# Patient Record
Sex: FEMALE | Race: WHITE | Employment: FULL TIME | URBAN - METROPOLITAN AREA
[De-identification: names, ages, dates, MRNs, and addresses within clinical notes are randomized per-mention and may not be internally consistent; named-entity substitution may affect disease eponyms.]

---

## 2017-02-18 ENCOUNTER — HOSPITAL ENCOUNTER (OUTPATIENT)
Dept: NON INVASIVE DIAGNOSTICS | Age: 52
Discharge: HOME | End: 2017-02-18
Attending: FAMILY MEDICINE | Admitting: FAMILY MEDICINE

## 2017-02-18 LAB
ANION GAP SERPL CALCULATED.3IONS-SCNC: 14 MMOL/L (ref 3–16)
BASOPHILS ABSOLUTE: 0 K/UL (ref 0–0.2)
BASOPHILS RELATIVE PERCENT: 0.6 %
BUN BLDV-MCNC: 12 MG/DL (ref 7–20)
CALCIUM SERPL-MCNC: 9.4 MG/DL (ref 8.3–10.6)
CHLORIDE BLD-SCNC: 101 MMOL/L (ref 99–110)
CHOLESTEROL, TOTAL: 227 MG/DL (ref 0–199)
CO2: 28 MMOL/L (ref 21–32)
CREAT SERPL-MCNC: 0.6 MG/DL (ref 0.6–1.1)
EOSINOPHILS ABSOLUTE: 0.1 K/UL (ref 0–0.6)
EOSINOPHILS RELATIVE PERCENT: 2.4 %
GFR AFRICAN AMERICAN: >60
GFR NON-AFRICAN AMERICAN: >60
GLUCOSE BLD-MCNC: 84 MG/DL (ref 70–99)
HCT VFR BLD CALC: 45.3 % (ref 36–48)
HDLC SERPL-MCNC: 68 MG/DL (ref 40–60)
HEMOGLOBIN: 14.8 G/DL (ref 12–16)
LDL CHOLESTEROL CALCULATED: 138 MG/DL
LYMPHOCYTES ABSOLUTE: 1.9 K/UL (ref 1–5.1)
LYMPHOCYTES RELATIVE PERCENT: 35.1 %
MCH RBC QN AUTO: 30.2 PG (ref 26–34)
MCHC RBC AUTO-ENTMCNC: 32.6 G/DL (ref 31–36)
MCV RBC AUTO: 92.7 FL (ref 80–100)
MONOCYTES ABSOLUTE: 0.4 K/UL (ref 0–1.3)
MONOCYTES RELATIVE PERCENT: 7.7 %
NEUTROPHILS ABSOLUTE: 2.9 K/UL (ref 1.7–7.7)
NEUTROPHILS RELATIVE PERCENT: 54.2 %
PDW BLD-RTO: 12.3 % (ref 12.4–15.4)
PLATELET # BLD: 188 K/UL (ref 135–450)
PMV BLD AUTO: 9.7 FL (ref 5–10.5)
POTASSIUM SERPL-SCNC: 4.7 MMOL/L (ref 3.5–5.1)
RBC # BLD: 4.89 M/UL (ref 4–5.2)
SODIUM BLD-SCNC: 143 MMOL/L (ref 136–145)
TRIGL SERPL-MCNC: 103 MG/DL (ref 0–150)
VITAMIN D 25-HYDROXY: 26.1 NG/ML
VLDLC SERPL CALC-MCNC: 21 MG/DL
WBC # BLD: 5.4 K/UL (ref 4–11)

## 2019-09-11 ENCOUNTER — HOSPITAL ENCOUNTER (INPATIENT)
Age: 54
LOS: 1 days | Discharge: HOME | DRG: 390 | End: 2019-09-12
Attending: EMERGENCY MEDICINE | Admitting: INTERNAL MEDICINE
Payer: COMMERCIAL

## 2019-09-11 ENCOUNTER — APPOINTMENT (OUTPATIENT)
Dept: GENERAL RADIOLOGY | Age: 54
DRG: 390 | End: 2019-09-11
Payer: COMMERCIAL

## 2019-09-11 ENCOUNTER — APPOINTMENT (OUTPATIENT)
Dept: CT IMAGING | Age: 54
DRG: 390 | End: 2019-09-11
Payer: COMMERCIAL

## 2019-09-11 DIAGNOSIS — K56.600 PARTIAL INTESTINAL OBSTRUCTION, UNSPECIFIED CAUSE (HCC): Primary | ICD-10-CM

## 2019-09-11 LAB
ALBUMIN SERPL-MCNC: 4.6 G/DL (ref 3.4–5)
ALP BLD-CCNC: 99 U/L (ref 40–129)
ALT SERPL-CCNC: 22 U/L (ref 10–40)
ANION GAP SERPL CALCULATED.3IONS-SCNC: 13 MMOL/L (ref 3–16)
AST SERPL-CCNC: 22 U/L (ref 15–37)
BACTERIA: ABNORMAL /HPF
BASOPHILS ABSOLUTE: 0 K/UL (ref 0–0.2)
BASOPHILS RELATIVE PERCENT: 0.3 %
BILIRUB SERPL-MCNC: 0.6 MG/DL (ref 0–1)
BILIRUBIN DIRECT: <0.2 MG/DL (ref 0–0.3)
BILIRUBIN URINE: NEGATIVE
BILIRUBIN, INDIRECT: NORMAL MG/DL (ref 0–1)
BLOOD, URINE: NEGATIVE
BUN BLDV-MCNC: 12 MG/DL (ref 7–20)
C-REACTIVE PROTEIN: 4.6 MG/L (ref 0–5.1)
C4 COMPLEMENT: 18.1 MG/DL (ref 10–40)
CALCIUM SERPL-MCNC: 9.9 MG/DL (ref 8.3–10.6)
CHLORIDE BLD-SCNC: 103 MMOL/L (ref 99–110)
CLARITY: CLEAR
CO2: 27 MMOL/L (ref 21–32)
COLOR: YELLOW
CREAT SERPL-MCNC: 0.5 MG/DL (ref 0.6–1.1)
EOSINOPHILS ABSOLUTE: 0.1 K/UL (ref 0–0.6)
EOSINOPHILS RELATIVE PERCENT: 1.7 %
EPITHELIAL CELLS, UA: ABNORMAL /HPF
GFR AFRICAN AMERICAN: >60
GFR NON-AFRICAN AMERICAN: >60
GLUCOSE BLD-MCNC: 114 MG/DL (ref 70–99)
GLUCOSE URINE: NEGATIVE MG/DL
HCT VFR BLD CALC: 45.6 % (ref 36–48)
HEMOGLOBIN: 14.8 G/DL (ref 12–16)
KETONES, URINE: NEGATIVE MG/DL
LEUKOCYTE ESTERASE, URINE: NEGATIVE
LIPASE: 20 U/L (ref 13–60)
LYMPHOCYTES ABSOLUTE: 1.3 K/UL (ref 1–5.1)
LYMPHOCYTES RELATIVE PERCENT: 15.7 %
MCH RBC QN AUTO: 29.5 PG (ref 26–34)
MCHC RBC AUTO-ENTMCNC: 32.5 G/DL (ref 31–36)
MCV RBC AUTO: 90.8 FL (ref 80–100)
MICROSCOPIC EXAMINATION: YES
MONOCYTES ABSOLUTE: 0.5 K/UL (ref 0–1.3)
MONOCYTES RELATIVE PERCENT: 6 %
MUCUS: ABNORMAL /LPF
NEUTROPHILS ABSOLUTE: 6.6 K/UL (ref 1.7–7.7)
NEUTROPHILS RELATIVE PERCENT: 76.3 %
NITRITE, URINE: NEGATIVE
PDW BLD-RTO: 12.1 % (ref 12.4–15.4)
PH UA: >=9 (ref 5–8)
PLATELET # BLD: 235 K/UL (ref 135–450)
PMV BLD AUTO: 9.1 FL (ref 5–10.5)
POTASSIUM REFLEX MAGNESIUM: 3.9 MMOL/L (ref 3.5–5.1)
PROTEIN UA: 30 MG/DL
RBC # BLD: 5.03 M/UL (ref 4–5.2)
RBC UA: ABNORMAL /HPF (ref 0–2)
SODIUM BLD-SCNC: 143 MMOL/L (ref 136–145)
SPECIFIC GRAVITY UA: 1.01 (ref 1–1.03)
TOTAL PROTEIN: 7.3 G/DL (ref 6.4–8.2)
URINE REFLEX TO CULTURE: ABNORMAL
URINE TYPE: ABNORMAL
UROBILINOGEN, URINE: 0.2 E.U./DL
WBC # BLD: 8.5 K/UL (ref 4–11)
WBC UA: ABNORMAL /HPF (ref 0–5)

## 2019-09-11 PROCEDURE — 74019 RADEX ABDOMEN 2 VIEWS: CPT

## 2019-09-11 PROCEDURE — 86140 C-REACTIVE PROTEIN: CPT

## 2019-09-11 PROCEDURE — 80048 BASIC METABOLIC PNL TOTAL CA: CPT

## 2019-09-11 PROCEDURE — C9113 INJ PANTOPRAZOLE SODIUM, VIA: HCPCS | Performed by: EMERGENCY MEDICINE

## 2019-09-11 PROCEDURE — 6360000004 HC RX CONTRAST MEDICATION: Performed by: EMERGENCY MEDICINE

## 2019-09-11 PROCEDURE — 1200000000 HC SEMI PRIVATE

## 2019-09-11 PROCEDURE — 83690 ASSAY OF LIPASE: CPT

## 2019-09-11 PROCEDURE — 36415 COLL VENOUS BLD VENIPUNCTURE: CPT

## 2019-09-11 PROCEDURE — 74177 CT ABD & PELVIS W/CONTRAST: CPT

## 2019-09-11 PROCEDURE — 96376 TX/PRO/DX INJ SAME DRUG ADON: CPT

## 2019-09-11 PROCEDURE — 2580000003 HC RX 258: Performed by: EMERGENCY MEDICINE

## 2019-09-11 PROCEDURE — 96361 HYDRATE IV INFUSION ADD-ON: CPT

## 2019-09-11 PROCEDURE — 6360000002 HC RX W HCPCS: Performed by: EMERGENCY MEDICINE

## 2019-09-11 PROCEDURE — 99285 EMERGENCY DEPT VISIT HI MDM: CPT

## 2019-09-11 PROCEDURE — 80076 HEPATIC FUNCTION PANEL: CPT

## 2019-09-11 PROCEDURE — 86160 COMPLEMENT ANTIGEN: CPT

## 2019-09-11 PROCEDURE — 81001 URINALYSIS AUTO W/SCOPE: CPT

## 2019-09-11 PROCEDURE — 96365 THER/PROPH/DIAG IV INF INIT: CPT

## 2019-09-11 PROCEDURE — 96375 TX/PRO/DX INJ NEW DRUG ADDON: CPT

## 2019-09-11 PROCEDURE — 6370000000 HC RX 637 (ALT 250 FOR IP): Performed by: INTERNAL MEDICINE

## 2019-09-11 PROCEDURE — 85025 COMPLETE CBC W/AUTO DIFF WBC: CPT

## 2019-09-11 PROCEDURE — 2580000003 HC RX 258: Performed by: INTERNAL MEDICINE

## 2019-09-11 RX ORDER — CHOLECALCIFEROL (VITAMIN D3) 125 MCG
2000 CAPSULE ORAL DAILY
COMMUNITY

## 2019-09-11 RX ORDER — ACETAMINOPHEN 325 MG/1
650 TABLET ORAL EVERY 4 HOURS PRN
Status: DISCONTINUED | OUTPATIENT
Start: 2019-09-11 | End: 2019-09-12 | Stop reason: HOSPADM

## 2019-09-11 RX ORDER — ATORVASTATIN CALCIUM 20 MG/1
20 TABLET, FILM COATED ORAL NIGHTLY
Status: DISCONTINUED | OUTPATIENT
Start: 2019-09-11 | End: 2019-09-12 | Stop reason: HOSPADM

## 2019-09-11 RX ORDER — KETOROLAC TROMETHAMINE 30 MG/ML
30 INJECTION, SOLUTION INTRAMUSCULAR; INTRAVENOUS ONCE
Status: COMPLETED | OUTPATIENT
Start: 2019-09-11 | End: 2019-09-11

## 2019-09-11 RX ORDER — SODIUM CHLORIDE 9 MG/ML
INJECTION, SOLUTION INTRAVENOUS CONTINUOUS
Status: DISCONTINUED | OUTPATIENT
Start: 2019-09-11 | End: 2019-09-12 | Stop reason: HOSPADM

## 2019-09-11 RX ORDER — PANTOPRAZOLE SODIUM 40 MG/1
80 TABLET, DELAYED RELEASE ORAL ONCE
Status: DISCONTINUED | OUTPATIENT
Start: 2019-09-11 | End: 2019-09-11

## 2019-09-11 RX ORDER — SERTRALINE HYDROCHLORIDE 25 MG/1
25 TABLET, FILM COATED ORAL DAILY
Status: DISCONTINUED | OUTPATIENT
Start: 2019-09-11 | End: 2019-09-11

## 2019-09-11 RX ORDER — 0.9 % SODIUM CHLORIDE 0.9 %
1000 INTRAVENOUS SOLUTION INTRAVENOUS ONCE
Status: COMPLETED | OUTPATIENT
Start: 2019-09-11 | End: 2019-09-11

## 2019-09-11 RX ORDER — SODIUM CHLORIDE 9 MG/ML
INJECTION, SOLUTION INTRAVENOUS
Status: DISPENSED
Start: 2019-09-11 | End: 2019-09-11

## 2019-09-11 RX ORDER — ONDANSETRON 2 MG/ML
4 INJECTION INTRAMUSCULAR; INTRAVENOUS
Status: DISCONTINUED | OUTPATIENT
Start: 2019-09-11 | End: 2019-09-12 | Stop reason: HOSPADM

## 2019-09-11 RX ORDER — TRAMADOL HYDROCHLORIDE 50 MG/1
100 TABLET ORAL EVERY 6 HOURS PRN
Status: DISCONTINUED | OUTPATIENT
Start: 2019-09-11 | End: 2019-09-12 | Stop reason: HOSPADM

## 2019-09-11 RX ORDER — PANTOPRAZOLE SODIUM 40 MG/1
40 TABLET, DELAYED RELEASE ORAL
Status: DISCONTINUED | OUTPATIENT
Start: 2019-09-12 | End: 2019-09-12 | Stop reason: HOSPADM

## 2019-09-11 RX ORDER — SODIUM CHLORIDE 0.9 % (FLUSH) 0.9 %
10 SYRINGE (ML) INJECTION EVERY 12 HOURS SCHEDULED
Status: DISCONTINUED | OUTPATIENT
Start: 2019-09-11 | End: 2019-09-12 | Stop reason: HOSPADM

## 2019-09-11 RX ORDER — ONDANSETRON 2 MG/ML
4 INJECTION INTRAMUSCULAR; INTRAVENOUS EVERY 6 HOURS PRN
Status: DISCONTINUED | OUTPATIENT
Start: 2019-09-11 | End: 2019-09-12 | Stop reason: HOSPADM

## 2019-09-11 RX ORDER — GABAPENTIN 300 MG/1
600 CAPSULE ORAL NIGHTLY
Status: DISCONTINUED | OUTPATIENT
Start: 2019-09-11 | End: 2019-09-12

## 2019-09-11 RX ORDER — MORPHINE SULFATE 2 MG/ML
4 INJECTION, SOLUTION INTRAMUSCULAR; INTRAVENOUS
Status: COMPLETED | OUTPATIENT
Start: 2019-09-11 | End: 2019-09-11

## 2019-09-11 RX ORDER — OMEPRAZOLE 40 MG/1
40 CAPSULE, DELAYED RELEASE ORAL
Status: ON HOLD | COMMUNITY
Start: 2019-07-18 | End: 2020-04-24 | Stop reason: ALTCHOICE

## 2019-09-11 RX ORDER — TRAMADOL HYDROCHLORIDE 50 MG/1
50 TABLET ORAL EVERY 6 HOURS PRN
Status: DISCONTINUED | OUTPATIENT
Start: 2019-09-11 | End: 2019-09-12 | Stop reason: HOSPADM

## 2019-09-11 RX ORDER — SERTRALINE HYDROCHLORIDE 25 MG/1
25 TABLET, FILM COATED ORAL NIGHTLY
Status: DISCONTINUED | OUTPATIENT
Start: 2019-09-11 | End: 2019-09-12 | Stop reason: HOSPADM

## 2019-09-11 RX ORDER — SODIUM CHLORIDE 0.9 % (FLUSH) 0.9 %
10 SYRINGE (ML) INJECTION PRN
Status: DISCONTINUED | OUTPATIENT
Start: 2019-09-11 | End: 2019-09-12 | Stop reason: HOSPADM

## 2019-09-11 RX ADMIN — IOHEXOL 50 ML: 240 INJECTION, SOLUTION INTRATHECAL; INTRAVASCULAR; INTRAVENOUS; ORAL at 10:44

## 2019-09-11 RX ADMIN — SODIUM CHLORIDE 1000 ML: 9 INJECTION, SOLUTION INTRAVENOUS at 09:36

## 2019-09-11 RX ADMIN — SODIUM CHLORIDE 1000 ML: 9 INJECTION, SOLUTION INTRAVENOUS at 13:33

## 2019-09-11 RX ADMIN — KETOROLAC TROMETHAMINE 30 MG: 30 INJECTION, SOLUTION INTRAMUSCULAR at 13:34

## 2019-09-11 RX ADMIN — GABAPENTIN 600 MG: 300 CAPSULE ORAL at 20:40

## 2019-09-11 RX ADMIN — IOVERSOL 100 ML: 678 INJECTION INTRA-ARTERIAL; INTRAVENOUS at 12:11

## 2019-09-11 RX ADMIN — ONDANSETRON 4 MG: 2 INJECTION INTRAMUSCULAR; INTRAVENOUS at 09:38

## 2019-09-11 RX ADMIN — SODIUM CHLORIDE: 9 INJECTION, SOLUTION INTRAVENOUS at 16:24

## 2019-09-11 RX ADMIN — SODIUM CHLORIDE 80 MG: 9 INJECTION, SOLUTION INTRAVENOUS at 09:59

## 2019-09-11 RX ADMIN — MORPHINE SULFATE 4 MG: 2 INJECTION, SOLUTION INTRAMUSCULAR; INTRAVENOUS at 10:40

## 2019-09-11 RX ADMIN — SERTRALINE HYDROCHLORIDE 25 MG: 25 TABLET ORAL at 20:40

## 2019-09-11 RX ADMIN — MORPHINE SULFATE 4 MG: 2 INJECTION, SOLUTION INTRAMUSCULAR; INTRAVENOUS at 09:39

## 2019-09-11 RX ADMIN — ATORVASTATIN CALCIUM 20 MG: 20 TABLET, FILM COATED ORAL at 20:40

## 2019-09-11 ASSESSMENT — PAIN DESCRIPTION - DESCRIPTORS: DESCRIPTORS: DULL;SHARP;ACHING

## 2019-09-11 ASSESSMENT — PAIN SCALES - GENERAL
PAINLEVEL_OUTOF10: 3
PAINLEVEL_OUTOF10: 1
PAINLEVEL_OUTOF10: 5
PAINLEVEL_OUTOF10: 4
PAINLEVEL_OUTOF10: 0
PAINLEVEL_OUTOF10: 0
PAINLEVEL_OUTOF10: 10
PAINLEVEL_OUTOF10: 3
PAINLEVEL_OUTOF10: 2
PAINLEVEL_OUTOF10: 10
PAINLEVEL_OUTOF10: 1
PAINLEVEL_OUTOF10: 7

## 2019-09-11 ASSESSMENT — PAIN DESCRIPTION - ONSET: ONSET: AWAKENED FROM SLEEP

## 2019-09-11 ASSESSMENT — PAIN DESCRIPTION - ORIENTATION: ORIENTATION: UPPER

## 2019-09-11 ASSESSMENT — PAIN - FUNCTIONAL ASSESSMENT: PAIN_FUNCTIONAL_ASSESSMENT: PREVENTS OR INTERFERES WITH MANY ACTIVE NOT PASSIVE ACTIVITIES

## 2019-09-11 ASSESSMENT — PAIN DESCRIPTION - LOCATION
LOCATION: ABDOMEN

## 2019-09-11 ASSESSMENT — PAIN DESCRIPTION - FREQUENCY: FREQUENCY: INTERMITTENT

## 2019-09-11 ASSESSMENT — PAIN DESCRIPTION - PAIN TYPE: TYPE: ACUTE PAIN

## 2019-09-11 NOTE — CONSULTS
97.5 °F (36.4 °C)    TempSrc:  Oral Oral    SpO2: 97% 97% 97% 97%   Weight:       Height:            Physical Exam:  Gen: Resting in bed, NAD   CV: RRR no MRG   Pul: CTAB   Abd: Good bowel sounds throughout, no scars, soft, NT/ND, no masses, no HSM   Ext: No edema, 2+ LE pulses   Neuro: No gross deficits, follows commands, moves all 4 extremities   Skin: No jaundice, spider angiomas, oakley erythema    LABS AND IMAGING     Recent Results (from the past 24 hour(s))   CBC Auto Differential    Collection Time: 09/11/19  9:20 AM   Result Value Ref Range    WBC 8.5 4.0 - 11.0 K/uL    RBC 5.03 4.00 - 5.20 M/uL    Hemoglobin 14.8 12.0 - 16.0 g/dL    Hematocrit 45.6 36.0 - 48.0 %    MCV 90.8 80.0 - 100.0 fL    MCH 29.5 26.0 - 34.0 pg    MCHC 32.5 31.0 - 36.0 g/dL    RDW 12.1 (L) 12.4 - 15.4 %    Platelets 795 900 - 671 K/uL    MPV 9.1 5.0 - 10.5 fL    Neutrophils % 76.3 %    Lymphocytes % 15.7 %    Monocytes % 6.0 %    Eosinophils % 1.7 %    Basophils % 0.3 %    Neutrophils Absolute 6.6 1.7 - 7.7 K/uL    Lymphocytes Absolute 1.3 1.0 - 5.1 K/uL    Monocytes Absolute 0.5 0.0 - 1.3 K/uL    Eosinophils Absolute 0.1 0.0 - 0.6 K/uL    Basophils Absolute 0.0 0.0 - 0.2 K/uL   Basic Metabolic Panel w/ Reflex to MG    Collection Time: 09/11/19  9:20 AM   Result Value Ref Range    Sodium 143 136 - 145 mmol/L    Potassium reflex Magnesium 3.9 3.5 - 5.1 mmol/L    Chloride 103 99 - 110 mmol/L    CO2 27 21 - 32 mmol/L    Anion Gap 13 3 - 16    Glucose 114 (H) 70 - 99 mg/dL    BUN 12 7 - 20 mg/dL    CREATININE 0.5 (L) 0.6 - 1.1 mg/dL    GFR Non-African American >60 >60    GFR African American >60 >60    Calcium 9.9 8.3 - 10.6 mg/dL   Hepatic Function Panel    Collection Time: 09/11/19  9:20 AM   Result Value Ref Range    Total Protein 7.3 6.4 - 8.2 g/dL    Alb 4.6 3.4 - 5.0 g/dL    Alkaline Phosphatase 99 40 - 129 U/L    ALT 22 10 - 40 U/L    AST 22 15 - 37 U/L    Total Bilirubin 0.6 0.0 - 1.0 mg/dL    Bilirubin, Direct <0.2 0.0 - 0.3

## 2019-09-11 NOTE — ED NOTES
Tolerating oral contrast, no emesis, no nausea, pain is almost gone     Eloisa Mitchell, MILI  09/11/19 5265

## 2019-09-11 NOTE — H&P
Hospital Medicine History & Physical      PCP: DARYN Mosqueda    Date of Admission: 9/11/2019    Date of Service: Pt seen/examined on 9/11/19 and Admitted to Inpatient     Chief Complaint: Abdominal pain    History Of Present Illness: The patient is a 49 y.o. female who presents to [facility] with abdominal pain. Patient states that earlier this morning she woke up with excruciating abdominal pain located in her epigastrium area rated 10 out of 10 with radiation to both sides of her abdomen. Patient states this felt similar to her previous partial small bowel obstruction and decided to come to the hospital.  In the emergency department a CT was performed that showed partial small bowel obstruction. Patient denies any nausea or vomiting at this time. She also denies any fever or chills. Patient is admitted to the hospital for further intervention and care. GI and general surgery of both been consulted    Past Medical History:        Diagnosis Date    Colitis     Hyperlipidemia     Small bowel obstruction ([address]        Past Surgical History:        Procedure Laterality Date    ENDOMETRIAL ABLATION      TONSILLECTOMY         Medications Prior to Admission:    Prior to Admission medications    Medication Sig Start Date End Date Taking?  Authorizing Provider   omeprazole (PRILOSEC) 40 MG delayed release capsule Take 40 mg by mouth 7/18/19 7/17/20 Yes Historical Provider, MD   Cholecalciferol (VITAMIN D3) 3000 units TABS Take by mouth   Yes Historical Provider, MD   fluticasone (VERAMYST) 27.5 MCG/SPRAY nasal spray 2 sprays by Each Nostril route daily   Yes Historical Provider, MD   sertraline (ZOLOFT) 25 MG tablet Take 25 mg by mouth   Yes Historical Provider, MD   atorvastatin (LIPITOR) 20 MG tablet Take 20 mg by mouth 5/23/17 9/11/19 Yes Historical Provider, MD gabapentin (NEURONTIN) 300 MG capsule Take 600 mg by mouth nightly. Historical Provider, MD       Allergies:  Patient has no known allergies. Social History:  The patient currently lives at home    TOBACCO:   reports that she has never smoked. She has never used smokeless tobacco.  ETOH:   reports that she does not drink alcohol. Family History:  Reviewed in detail and negative for DM, Early CAD, Cancer, CVA. Positive as follows:        Problem Relation Age of Onset    High Cholesterol Mother     High Blood Pressure Mother     Stroke Mother        REVIEW OF SYSTEMS:   Positive for as noted in the HPI. All other systems reviewed and negative. PHYSICAL EXAM:    /67   Pulse 74   Temp 97.5 °F (36.4 °C) (Oral)   Resp 16   Ht 5' 6\" (1.676 m)   Wt 159 lb 13.3 oz (72.5 kg)   SpO2 97%   BMI 25.80 kg/m²     General appearance: No apparent distress appears stated age and cooperative. HEENT Normal cephalic, atraumatic without obvious deformity. Pupils equal, round, and reactive to light. Extra ocular muscles intact. Conjunctivae/corneas clear. Neck: Supple, No jugular venous distention/bruits. Trachea midline without thyromegaly or adenopathy with full range of motion. Lungs: Clear to auscultation, bilaterally without Rales/Wheezes/Rhonchi with good respiratory effort. Heart: Regular rate and rhythm with Normal S1/S2 without murmurs, rubs or gallops, point of maximum impulse non-displaced  Abdomen: Soft, non-tender or non-distended without rigidity or guarding and hyperactive bowel sounds  Extremities: No clubbing, cyanosis, or edema bilaterally. Full range of motion without deformity and normal gait intact. Skin: Skin color, texture, turgor normal.  No rashes or lesions. Neurologic: Alert and oriented X 3, neurovascularly intact with sensory/motor intact upper extremities/lower extremities, bilaterally.   Cranial nerves: II-XII intact, grossly non-focal.  Mental status: Alert,

## 2019-09-11 NOTE — ED PROVIDER NOTES
[facility] Emergency Department    CHIEF COMPLAINT  Abdominal Pain (sudden onset of abdominal pain 0400, woke her up , + Nausea, no emesis, last BM this morning. Denies purinary sx.)       HISTORY OF PRESENT ILLNESS  Dre Manzo is a 49 y.o. female  who presents to the ED complaining of acute onset of generalized mostly midline abdominal pain starting around 4 AM.  She went to bed feeling fine. She had a BM this morning that was normal for her although she says she was constipated over the weekend. She says the constipation is been intermittent but chronic problem for her. She denies any mario diarrhea. She has had nausea but no vomiting this morning. Denies any recent alcohol use or chronic NSAID use. She does have a history of gastritis in the past as well as a small bowel obstruction diagnosed at [facility] in the past.  She is on a PPI daily. She has no dysuria hematuria or other urinary complaints. She also denies any chest pains coughing or shortness of breath. She has not had fevers. She is unsure what may be causing her symptomatology. She denies any prior abdominal surgeries. No other complaints, modifying factors or associated symptoms. I have reviewed the following from the nursing documentation.     Past Medical History:   Diagnosis Date    Colitis     Hyperlipidemia     Small bowel obstruction (HCC)      Past Surgical History:   Procedure Laterality Date    ENDOMETRIAL ABLATION      TONSILLECTOMY       Family History   Problem Relation Age of Onset    High Cholesterol Mother     High Blood Pressure Mother     Stroke Mother      Social History     Socioeconomic History    Marital status:      Spouse name: Not on file    Number of children: Not on file    Years of education: Not on file    Highest education level: Not on file   Occupational History    Not on file   Social Needs    Financial resource strain: Not on file   [place] insecurity:     Worry: Not on file     Inability: Not on file    Transportation needs:     Medical: Not on file     Non-medical: Not on file   Tobacco Use    Smoking status: Never Smoker    Smokeless tobacco: Never Used   Substance and Sexual Activity    Alcohol use: No    Drug use: Never    Sexual activity: Not on file   Lifestyle    Physical activity:     Days per week: Not on file     Minutes per session: Not on file    Stress: Not on file   Relationships    Social connections:     Talks on phone: Not on file     Gets together: Not on file     Attends Bahai service: Not on file     Active member of club or organization: Not on file     Attends meetings of clubs or organizations: Not on file     Relationship status: Not on file    Intimate partner violence:     Fear of current or ex partner: Not on file     Emotionally abused: Not on file     Physically abused: Not on file     Forced sexual activity: Not on file   Other Topics Concern    Not on file   Social History Narrative    ** Merged History Encounter **          Current Facility-Administered Medications   Medication Dose Route Frequency Provider Last Rate Last Dose    ondansetron (ZOFRAN) injection 4 mg  4 mg Intravenous Q1H PRN Nicholas Leggett MD   4 mg at 09/11/19 0168     Current Outpatient Medications   Medication Sig Dispense Refill    omeprazole (PRILOSEC) 40 MG delayed release capsule Take 40 mg by mouth      Cholecalciferol (VITAMIN D3) 3000 units TABS Take by mouth      fluticasone (VERAMYST) 27.5 MCG/SPRAY nasal spray 2 sprays by Each Nostril route daily      sertraline (ZOLOFT) 25 MG tablet Take 25 mg by mouth      atorvastatin (LIPITOR) 20 MG tablet Take 20 mg by mouth      gabapentin (NEURONTIN) 300 MG capsule Take 600 mg by mouth nightly. No Known Allergies    REVIEW OF SYSTEMS  10 systems reviewed, pertinent positives per HPI otherwise noted to be negative.     PHYSICAL EXAM  BP (!) 109/57   Pulse 79   Temp 97.6 °F (36.4 °C) (Oral)   Resp 12   Ht 5' 6\" (1.676 m)   Wt 159 lb 13.3 oz (72.5 kg)   SpO2 97%   BMI 25.80 kg/m²    GENERAL APPEARANCE: Awake and alert. Cooperative. Mild distress. HENT: Normocephalic. Atraumatic. Mucous membranes are dry. NECK: Supple. EYES: PERRL. EOM's grossly intact. HEART/CHEST: RRR. No murmurs. No chest wall tenderness. LUNGS: Respirations unlabored. CTAB. Good air exchange. Speaking comfortably in full sentences. ABDOMEN: Moderate periumbilical and epig ttp, mild diffuse ttp elsewhere. Soft. Non-distended. No masses. No organomegaly. No guarding or rebound. Normal bowel sounds throughout. MUSCULOSKELETAL: No extremity edema. Compartments soft. No deformity. No tenderness in the extremities. All extremities neurovascularly intact. SKIN: Warm and dry. No acute rashes. NEUROLOGICAL: Alert and oriented. CN's 2-12 intact. No gross facial drooping. Strength 5/5, sensation intact. 2 plus DTR's in knees bilaterally. Gait normal.  PSYCHIATRIC: Normal mood and affect. LABS  I have reviewed all labs for this visit.    Results for orders placed or performed during the hospital encounter of 09/11/19   CBC Auto Differential   Result Value Ref Range    WBC 8.5 4.0 - 11.0 K/uL    RBC 5.03 4.00 - 5.20 M/uL    Hemoglobin 14.8 12.0 - 16.0 g/dL    Hematocrit 45.6 36.0 - 48.0 %    MCV 90.8 80.0 - 100.0 fL    MCH 29.5 26.0 - 34.0 pg    MCHC 32.5 31.0 - 36.0 g/dL    RDW 12.1 (L) 12.4 - 15.4 %    Platelets 334 466 - 321 K/uL    MPV 9.1 5.0 - 10.5 fL    Neutrophils % 76.3 %    Lymphocytes % 15.7 %    Monocytes % 6.0 %    Eosinophils % 1.7 %    Basophils % 0.3 %    Neutrophils Absolute 6.6 1.7 - 7.7 K/uL    Lymphocytes Absolute 1.3 1.0 - 5.1 K/uL    Monocytes Absolute 0.5 0.0 - 1.3 K/uL    Eosinophils Absolute 0.1 0.0 - 0.6 K/uL    Basophils Absolute 0.0 0.0 - 0.2 K/uL   Basic Metabolic Panel w/ Reflex to MG   Result Value Ref Range    Sodium 143 136 - 145 mmol/L    Potassium reflex Magnesium

## 2019-09-12 ENCOUNTER — APPOINTMENT (OUTPATIENT)
Dept: CT IMAGING | Age: 54
DRG: 390 | End: 2019-09-12
Payer: COMMERCIAL

## 2019-09-12 VITALS
HEART RATE: 85 BPM | BODY MASS INDEX: 26.08 KG/M2 | OXYGEN SATURATION: 96 % | RESPIRATION RATE: 18 BRPM | TEMPERATURE: 98.1 F | WEIGHT: 162.26 LBS | DIASTOLIC BLOOD PRESSURE: 68 MMHG | SYSTOLIC BLOOD PRESSURE: 110 MMHG | HEIGHT: 66 IN

## 2019-09-12 LAB
ANION GAP SERPL CALCULATED.3IONS-SCNC: 11 MMOL/L (ref 3–16)
BASOPHILS ABSOLUTE: 0 K/UL (ref 0–0.2)
BASOPHILS RELATIVE PERCENT: 0.5 %
BUN BLDV-MCNC: 10 MG/DL (ref 7–20)
CALCIUM SERPL-MCNC: 8.7 MG/DL (ref 8.3–10.6)
CHLORIDE BLD-SCNC: 107 MMOL/L (ref 99–110)
CO2: 27 MMOL/L (ref 21–32)
CREAT SERPL-MCNC: 0.5 MG/DL (ref 0.6–1.1)
EOSINOPHILS ABSOLUTE: 0.1 K/UL (ref 0–0.6)
EOSINOPHILS RELATIVE PERCENT: 2.3 %
GFR AFRICAN AMERICAN: >60
GFR NON-AFRICAN AMERICAN: >60
GLUCOSE BLD-MCNC: 90 MG/DL (ref 70–99)
HCT VFR BLD CALC: 38.6 % (ref 36–48)
HEMOGLOBIN: 13 G/DL (ref 12–16)
LYMPHOCYTES ABSOLUTE: 1.5 K/UL (ref 1–5.1)
LYMPHOCYTES RELATIVE PERCENT: 25.8 %
MCH RBC QN AUTO: 30.9 PG (ref 26–34)
MCHC RBC AUTO-ENTMCNC: 33.5 G/DL (ref 31–36)
MCV RBC AUTO: 92 FL (ref 80–100)
MONOCYTES ABSOLUTE: 0.4 K/UL (ref 0–1.3)
MONOCYTES RELATIVE PERCENT: 7.5 %
NEUTROPHILS ABSOLUTE: 3.8 K/UL (ref 1.7–7.7)
NEUTROPHILS RELATIVE PERCENT: 63.9 %
PDW BLD-RTO: 12.8 % (ref 12.4–15.4)
PLATELET # BLD: 199 K/UL (ref 135–450)
PMV BLD AUTO: 9.4 FL (ref 5–10.5)
POTASSIUM REFLEX MAGNESIUM: 3.6 MMOL/L (ref 3.5–5.1)
RBC # BLD: 4.2 M/UL (ref 4–5.2)
SEDIMENTATION RATE, ERYTHROCYTE: 10 MM/HR (ref 0–30)
SODIUM BLD-SCNC: 145 MMOL/L (ref 136–145)
WBC # BLD: 5.9 K/UL (ref 4–11)

## 2019-09-12 PROCEDURE — 2580000003 HC RX 258: Performed by: INTERNAL MEDICINE

## 2019-09-12 PROCEDURE — 6360000004 HC RX CONTRAST MEDICATION: Performed by: SURGERY

## 2019-09-12 PROCEDURE — 36415 COLL VENOUS BLD VENIPUNCTURE: CPT

## 2019-09-12 PROCEDURE — 74177 CT ABD & PELVIS W/CONTRAST: CPT

## 2019-09-12 PROCEDURE — APPNB30 APP NON BILLABLE TIME 0-30 MINS: Performed by: NURSE PRACTITIONER

## 2019-09-12 PROCEDURE — 99222 1ST HOSP IP/OBS MODERATE 55: CPT | Performed by: SURGERY

## 2019-09-12 PROCEDURE — 6370000000 HC RX 637 (ALT 250 FOR IP): Performed by: INTERNAL MEDICINE

## 2019-09-12 PROCEDURE — 85652 RBC SED RATE AUTOMATED: CPT

## 2019-09-12 PROCEDURE — 85025 COMPLETE CBC W/AUTO DIFF WBC: CPT

## 2019-09-12 PROCEDURE — 80048 BASIC METABOLIC PNL TOTAL CA: CPT

## 2019-09-12 PROCEDURE — 94760 N-INVAS EAR/PLS OXIMETRY 1: CPT

## 2019-09-12 PROCEDURE — APPSS30 APP SPLIT SHARED TIME 16-30 MINUTES: Performed by: NURSE PRACTITIONER

## 2019-09-12 PROCEDURE — 2500000003 HC RX 250 WO HCPCS: Performed by: SURGERY

## 2019-09-12 RX ORDER — TRAMADOL HYDROCHLORIDE 50 MG/1
50 TABLET ORAL EVERY 6 HOURS PRN
Qty: 12 TABLET | Refills: 0 | Status: SHIPPED | OUTPATIENT
Start: 2019-09-12 | End: 2019-09-15

## 2019-09-12 RX ADMIN — IOPAMIDOL 75 ML: 755 INJECTION, SOLUTION INTRAVENOUS at 12:45

## 2019-09-12 RX ADMIN — PANTOPRAZOLE SODIUM 40 MG: 40 TABLET, DELAYED RELEASE ORAL at 05:15

## 2019-09-12 RX ADMIN — BARIUM SULFATE 450 ML: 1 SUSPENSION ORAL at 12:44

## 2019-09-12 RX ADMIN — SODIUM CHLORIDE: 9 INJECTION, SOLUTION INTRAVENOUS at 05:15

## 2019-09-12 ASSESSMENT — PAIN SCALES - GENERAL: PAINLEVEL_OUTOF10: 0

## 2019-09-12 NOTE — CONSULTS
[facility]  [phone]            Surgery Consult      Pt Name: Tae Ross  MRN: [id number]  YOB: 1965  Date of evaluation: 9/12/2019  Primary Care Physician: Zoila Lee  Patient evaluated at the request of  Dr. Hever Bishop    Chief Complaint   Patient presents with    Abdominal Pain     sudden onset of abdominal pain 0400, woke her up , + Nausea, no emesis, last BM this morning. Denies urinary sx. Assessment/Plan   pSBO   -CT dilated segment of small bowel in pelvis   -passing flatus, BM 9/11  -denies pain this morning, hungry and eating breakfast.   -continue GI workup   -No surgical intervention necessary at this time. OK to DC from surgery standpoint if OK with GI and medicine teams. F/U with GI as indicated. SUBJECTIVE:   History of Chief Complaint:    Tae Ross is a 49 y.o. female who presents with abdominal pain. Sharp non radiating epigastric abdominal pain started around 0400, similar to a [place] [facility] where a CT revealed focal enteritis that resolved without surgical intervention. CT here revealed the same. Past Medical History   has a past medical history of Colitis, Hyperlipidemia, and Small bowel obstruction ([address] Past Surgical History   has a past surgical history that includes Endometrial ablation and Tonsillectomy. Medications  Prior to Admission medications    Medication Sig Start Date End Date Taking?  Authorizing Provider   omeprazole (PRILOSEC) 40 MG delayed release capsule Take 40 mg by mouth 7/18/19 7/17/20 Yes Historical Provider, MD   Cholecalciferol (VITAMIN D3) 3000 units TABS Take by mouth   Yes Historical Provider, MD   fluticasone (VERAMYST) 27.5 MCG/SPRAY nasal spray 2 sprays by Each Nostril route daily   Yes Historical Provider, MD   sertraline (ZOLOFT) 25 MG tablet Take 25 mg by mouth nightly    Yes Historical Provider, MD   atorvastatin (LIPITOR) 20 MG tablet Take 20 mg by mouth 5/23/17 nodes  HEENT: Head is normocephalic, atraumatic. EOMI, PERRLA. NECK: Supple, symmetrical, trachea midline, no adenopathy, thyroid symmetric, not enlarged and no tenderness, skin normal.  CHEST/LUNGS: chest symmetric with normal A/P diameter, normal respiratory rate and rhythm, lungs clear to auscultation without wheezes, rales or rhonchi. No accessory muscle use. CARDIOVASCULAR: Heart sounds are normal.  Regular rate and rhythm without murmur, gallop or rub. Carotid and femoral pulses 2+/4 and equal bilaterally. ABDOMEN: Soft, non tender, non distended  RECTAL: deferred, not clinically indicated  NEUROLOGIC: There are no focalizing motor or sensory deficits. CN II-XII are grossly intact. Honora [place] EXTREMITIES: no cyanosis, no clubbing and no edema. LABS:     Recent Labs     09/11/19  0920 09/11/19  0952   WBC 8.5  --    HGB 14.8  --    HCT 45.6  --      --      --    K 3.9  --      --    CO2 27  --    BUN 12  --    CREATININE 0.5*  --    CALCIUM 9.9  --    AST 22  --    ALT 22  --    BILITOT 0.6  --    BILIDIR <0.2  --    NITRU  --  Negative   COLORU  --  Yellow   BACTERIA  --  1+*     Recent Labs     09/11/19  0920   ALKPHOS 99   ALT 22   AST 22   BILITOT 0.6   BILIDIR <0.2   LABALBU 4.6   LIPASE 20.0         RADIOLOGY:   I have personally reviewed the following films:  CT ABDOMEN PELVIS W IV CONTRAST Additional Contrast? Oral   Final Result   1. Enteritis involving the mid ileum causing a partial small bowel   obstruction. 2. Trace amount pelvic ascites. XR ABDOMEN (2 VIEWS)   Final Result   Unremarkable radiographs of the abdomen. Thank you for the interesting evaluation. Further recommendations to follow.       Electronically signed by REGINA Zazueta CNP on 9/12/2019 at 6:38 AM      Attending    As per note above by Cy Trujillo  Patient was personally seen and examined by me today  Chart, labs and imaging reviewed    A/P  Partial SBO   Repeat imaging this

## 2019-09-12 NOTE — PROGRESS NOTES
Pharmacy Medication Reconciliation Note     List of medications patient is currently taking is complete. Source of information:   1. Conversation with pt at bedside      No Known Allergies    Notes regarding home medications:   1. Pt report no longer takes Neurontin.  Removed from home list.      Doug Caicedo [facility]  9/12/2019  1:29 PM
     Recent Labs     09/11/19  0920      K 3.9      CO2 27   BUN 12   CREATININE 0.5*     Recent Labs     09/11/19  0920   AST 22   ALT 22   BILIDIR <0.2   BILITOT 0.6   ALKPHOS 99     Recent Labs     09/11/19  0920   LIPASE 20.0     No results for input(s): PROTIME, INR in the last 72 hours. Imaging  CT ABDOMEN PELVIS W IV CONTRAST Additional Contrast? Oral   Final Result   1. Enteritis involving the mid ileum causing a partial small bowel   obstruction. 2. Trace amount pelvic ascites. XR ABDOMEN (2 VIEWS)   Final Result   Unremarkable radiographs of the abdomen. CT ENTEROGRAPHY W CONTRAST    (Results Pending)       Endoscopy      ASSESSMENT AND RECOMMENDATIONS   Vonnie Flynn is a 49 y.o. female with PMH of HLD, colitis, SBO who presented 9/11/19 with acute onset abdominal pain. CT with focal enteritis and partial small bowel obstruction. CRP normal.  C4 normal.  Fecal calprotectin pending    1. 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 No further pain. Passing flatus. RECOMMENDATIONS:    Clear liquid diet. Dagmar Myranda from GI standpoint to adv as tolerated  Obtain CT enterography  May benefit from nonurgent balloon enteroscopy as an outpt to evaluate for mass lesion or stricture as the cause of recurrent symptoms. Will have pt follow up with Dr. Daya Drew in the office after discharge      If you have any questions or need any further information, please feel free to contact us [phone]. Thank you for allowing us to participate in the care of Vonnie Flynn.     Caty KIRKLAND
midline. Respiratory:  Normal respiratory effort. Clear to auscultation, bilaterally without Rales/Wheezes/Rhonchi. Cardiovascular: Regular rate and rhythm with normal S1/S2 without murmurs, rubs or gallops. Abdomen: Soft, non-tender, non-distended with normal bowel sounds. Musculoskeletal: No clubbing, cyanosis or edema bilaterally. Full range of motion without deformity. Skin: Skin color, texture, turgor normal.  No rashes or lesions. Neurologic:  Neurovascularly intact without any focal sensory/motor deficits. Cranial nerves: II-XII intact, grossly non-focal.  Psychiatric: Alert and oriented, thought content appropriate, normal insight  Capillary Refill: Brisk,< 3 seconds   Peripheral Pulses: +2 palpable, equal bilaterally       Labs:   Recent Labs     09/11/19 0920   WBC 8.5   HGB 14.8   HCT 45.6        Recent Labs     09/11/19 0920      K 3.9      CO2 27   BUN 12   CREATININE 0.5*   CALCIUM 9.9     Recent Labs     09/11/19 0920   AST 22   ALT 22   BILIDIR <0.2   BILITOT 0.6   ALKPHOS 99     No results for input(s): INR in the last 72 hours. No results for input(s): [place]  in the last 72 hours. Urinalysis:      Lab Results   Component Value Date    NITRU Negative 09/11/2019    WBCUA 0-2 09/11/2019    BACTERIA 1+ 09/11/2019    RBCUA 0-2 09/11/2019    BLOODU Negative 09/11/2019    SPECGRAV 1.015 09/11/2019    GLUCOSEU Negative 09/11/2019       Radiology:  CT ABDOMEN PELVIS W IV CONTRAST Additional Contrast? Oral   Final Result   1. Enteritis involving the mid ileum causing a partial small bowel   obstruction. 2. Trace amount pelvic ascites. XR ABDOMEN (2 VIEWS)   Final Result   Unremarkable radiographs of the abdomen.                  Assessment/Plan:    Partial small bowel obstruction  CT with above results  General surgery consulted  N.p.o. for now  IV fluids     Enteritis  CT as above  GI consult for IBD work-up  Monitor     Hyperlipidemia  Continue

## 2020-04-23 ENCOUNTER — APPOINTMENT (OUTPATIENT)
Dept: CT IMAGING | Age: 55
DRG: 390 | End: 2020-04-23
Payer: COMMERCIAL

## 2020-04-23 ENCOUNTER — HOSPITAL ENCOUNTER (INPATIENT)
Age: 55
LOS: 2 days | Discharge: HOME | DRG: 390 | End: 2020-04-25
Attending: EMERGENCY MEDICINE | Admitting: INTERNAL MEDICINE
Payer: COMMERCIAL

## 2020-04-23 LAB
A/G RATIO: 2 (ref 1.1–2.2)
ALBUMIN SERPL-MCNC: 5 G/DL (ref 3.4–5)
ALP BLD-CCNC: 87 U/L (ref 40–129)
ALT SERPL-CCNC: 16 U/L (ref 10–40)
AMORPHOUS: ABNORMAL /HPF
ANION GAP SERPL CALCULATED.3IONS-SCNC: 15 MMOL/L (ref 3–16)
AST SERPL-CCNC: 19 U/L (ref 15–37)
BACTERIA: ABNORMAL /HPF
BASOPHILS ABSOLUTE: 0 K/UL (ref 0–0.2)
BASOPHILS RELATIVE PERCENT: 0.3 %
BILIRUB SERPL-MCNC: 0.8 MG/DL (ref 0–1)
BILIRUBIN URINE: NEGATIVE
BLOOD, URINE: ABNORMAL
BUN BLDV-MCNC: 18 MG/DL (ref 7–20)
CALCIUM SERPL-MCNC: 10.5 MG/DL (ref 8.3–10.6)
CHLORIDE BLD-SCNC: 101 MMOL/L (ref 99–110)
CLARITY: ABNORMAL
CO2: 28 MMOL/L (ref 21–32)
COLOR: YELLOW
CREAT SERPL-MCNC: 0.6 MG/DL (ref 0.6–1.1)
EOSINOPHILS ABSOLUTE: 0.1 K/UL (ref 0–0.6)
EOSINOPHILS RELATIVE PERCENT: 1 %
EPITHELIAL CELLS, UA: ABNORMAL /HPF (ref 0–5)
GFR AFRICAN AMERICAN: >60
GFR NON-AFRICAN AMERICAN: >60
GLOBULIN: 2.5 G/DL
GLUCOSE BLD-MCNC: 135 MG/DL (ref 70–99)
GLUCOSE URINE: NEGATIVE MG/DL
HCT VFR BLD CALC: 48.9 % (ref 36–48)
HEMOGLOBIN: 16 G/DL (ref 12–16)
KETONES, URINE: NEGATIVE MG/DL
LEUKOCYTE ESTERASE, URINE: ABNORMAL
LIPASE: 20 U/L (ref 13–60)
LYMPHOCYTES ABSOLUTE: 1.1 K/UL (ref 1–5.1)
LYMPHOCYTES RELATIVE PERCENT: 10 %
MCH RBC QN AUTO: 30.1 PG (ref 26–34)
MCHC RBC AUTO-ENTMCNC: 32.8 G/DL (ref 31–36)
MCV RBC AUTO: 91.7 FL (ref 80–100)
MICROSCOPIC EXAMINATION: YES
MONOCYTES ABSOLUTE: 0.5 K/UL (ref 0–1.3)
MONOCYTES RELATIVE PERCENT: 4.5 %
NEUTROPHILS ABSOLUTE: 9.3 K/UL (ref 1.7–7.7)
NEUTROPHILS RELATIVE PERCENT: 84.2 %
NITRITE, URINE: NEGATIVE
PDW BLD-RTO: 11.8 % (ref 12.4–15.4)
PH UA: >=9 (ref 5–8)
PLATELET # BLD: 238 K/UL (ref 135–450)
PMV BLD AUTO: 9 FL (ref 5–10.5)
POTASSIUM SERPL-SCNC: 3.8 MMOL/L (ref 3.5–5.1)
PROTEIN UA: 100 MG/DL
RBC # BLD: 5.33 M/UL (ref 4–5.2)
RBC UA: ABNORMAL /HPF (ref 0–4)
SODIUM BLD-SCNC: 144 MMOL/L (ref 136–145)
SPECIFIC GRAVITY UA: 1.01 (ref 1–1.03)
TOTAL PROTEIN: 7.5 G/DL (ref 6.4–8.2)
URINE REFLEX TO CULTURE: YES
URINE TYPE: ABNORMAL
UROBILINOGEN, URINE: 0.2 E.U./DL
WBC # BLD: 11 K/UL (ref 4–11)
WBC UA: ABNORMAL /HPF (ref 0–5)

## 2020-04-23 PROCEDURE — 80053 COMPREHEN METABOLIC PANEL: CPT

## 2020-04-23 PROCEDURE — 85025 COMPLETE CBC W/AUTO DIFF WBC: CPT

## 2020-04-23 PROCEDURE — 99285 EMERGENCY DEPT VISIT HI MDM: CPT

## 2020-04-23 PROCEDURE — 96374 THER/PROPH/DIAG INJ IV PUSH: CPT

## 2020-04-23 PROCEDURE — 83690 ASSAY OF LIPASE: CPT

## 2020-04-23 PROCEDURE — 36415 COLL VENOUS BLD VENIPUNCTURE: CPT

## 2020-04-23 PROCEDURE — 81001 URINALYSIS AUTO W/SCOPE: CPT

## 2020-04-23 PROCEDURE — 74177 CT ABD & PELVIS W/CONTRAST: CPT

## 2020-04-23 PROCEDURE — APPSS180 APP SPLIT SHARED TIME > 60 MINUTES: Performed by: NURSE PRACTITIONER

## 2020-04-23 PROCEDURE — 6360000004 HC RX CONTRAST MEDICATION: Performed by: EMERGENCY MEDICINE

## 2020-04-23 PROCEDURE — 2580000003 HC RX 258: Performed by: EMERGENCY MEDICINE

## 2020-04-23 PROCEDURE — 99222 1ST HOSP IP/OBS MODERATE 55: CPT | Performed by: SURGERY

## 2020-04-23 PROCEDURE — 96375 TX/PRO/DX INJ NEW DRUG ADDON: CPT

## 2020-04-23 PROCEDURE — 6360000002 HC RX W HCPCS: Performed by: EMERGENCY MEDICINE

## 2020-04-23 PROCEDURE — 87086 URINE CULTURE/COLONY COUNT: CPT

## 2020-04-23 PROCEDURE — 1200000000 HC SEMI PRIVATE

## 2020-04-23 RX ORDER — ACETAMINOPHEN 325 MG/1
650 TABLET ORAL EVERY 6 HOURS PRN
Status: DISCONTINUED | OUTPATIENT
Start: 2020-04-23 | End: 2020-04-25 | Stop reason: HOSPADM

## 2020-04-23 RX ORDER — PROMETHAZINE HYDROCHLORIDE 25 MG/1
12.5 TABLET ORAL EVERY 6 HOURS PRN
Status: DISCONTINUED | OUTPATIENT
Start: 2020-04-23 | End: 2020-04-25 | Stop reason: HOSPADM

## 2020-04-23 RX ORDER — SODIUM CHLORIDE 9 MG/ML
INJECTION, SOLUTION INTRAVENOUS CONTINUOUS
Status: DISCONTINUED | OUTPATIENT
Start: 2020-04-23 | End: 2020-04-25 | Stop reason: HOSPADM

## 2020-04-23 RX ORDER — ONDANSETRON 2 MG/ML
4 INJECTION INTRAMUSCULAR; INTRAVENOUS ONCE
Status: COMPLETED | OUTPATIENT
Start: 2020-04-23 | End: 2020-04-23

## 2020-04-23 RX ORDER — ACETAMINOPHEN 650 MG/1
650 SUPPOSITORY RECTAL EVERY 6 HOURS PRN
Status: DISCONTINUED | OUTPATIENT
Start: 2020-04-23 | End: 2020-04-25 | Stop reason: HOSPADM

## 2020-04-23 RX ORDER — MORPHINE SULFATE 2 MG/ML
4 INJECTION, SOLUTION INTRAMUSCULAR; INTRAVENOUS ONCE
Status: COMPLETED | OUTPATIENT
Start: 2020-04-23 | End: 2020-04-23

## 2020-04-23 RX ORDER — ONDANSETRON 2 MG/ML
4 INJECTION INTRAMUSCULAR; INTRAVENOUS EVERY 6 HOURS PRN
Status: DISCONTINUED | OUTPATIENT
Start: 2020-04-23 | End: 2020-04-25 | Stop reason: HOSPADM

## 2020-04-23 RX ORDER — SODIUM CHLORIDE 0.9 % (FLUSH) 0.9 %
10 SYRINGE (ML) INJECTION EVERY 12 HOURS SCHEDULED
Status: DISCONTINUED | OUTPATIENT
Start: 2020-04-23 | End: 2020-04-25 | Stop reason: HOSPADM

## 2020-04-23 RX ORDER — SODIUM CHLORIDE 0.9 % (FLUSH) 0.9 %
10 SYRINGE (ML) INJECTION PRN
Status: DISCONTINUED | OUTPATIENT
Start: 2020-04-23 | End: 2020-04-25 | Stop reason: HOSPADM

## 2020-04-23 RX ORDER — MAGNESIUM SULFATE IN WATER 40 MG/ML
2 INJECTION, SOLUTION INTRAVENOUS PRN
Status: DISCONTINUED | OUTPATIENT
Start: 2020-04-23 | End: 2020-04-25 | Stop reason: HOSPADM

## 2020-04-23 RX ORDER — M-VIT,TX,IRON,MINS/CALC/FOLIC 27MG-0.4MG
1 TABLET ORAL DAILY
COMMUNITY

## 2020-04-23 RX ORDER — POTASSIUM CHLORIDE 20 MEQ/1
40 TABLET, EXTENDED RELEASE ORAL PRN
Status: DISCONTINUED | OUTPATIENT
Start: 2020-04-23 | End: 2020-04-25 | Stop reason: HOSPADM

## 2020-04-23 RX ORDER — POLYETHYLENE GLYCOL 3350 17 G/17G
17 POWDER, FOR SOLUTION ORAL DAILY PRN
Status: DISCONTINUED | OUTPATIENT
Start: 2020-04-23 | End: 2020-04-23

## 2020-04-23 RX ORDER — POTASSIUM CHLORIDE 7.45 MG/ML
10 INJECTION INTRAVENOUS PRN
Status: DISCONTINUED | OUTPATIENT
Start: 2020-04-23 | End: 2020-04-25 | Stop reason: HOSPADM

## 2020-04-23 RX ADMIN — IOVERSOL 100 ML: 678 INJECTION INTRA-ARTERIAL; INTRAVENOUS at 03:35

## 2020-04-23 RX ADMIN — SODIUM CHLORIDE: 9 INJECTION, SOLUTION INTRAVENOUS at 08:05

## 2020-04-23 RX ADMIN — SODIUM CHLORIDE: 9 INJECTION, SOLUTION INTRAVENOUS at 03:12

## 2020-04-23 RX ADMIN — MORPHINE SULFATE 4 MG: 2 INJECTION, SOLUTION INTRAMUSCULAR; INTRAVENOUS at 03:13

## 2020-04-23 RX ADMIN — ONDANSETRON HYDROCHLORIDE 4 MG: 2 SOLUTION INTRAMUSCULAR; INTRAVENOUS at 03:11

## 2020-04-23 ASSESSMENT — PAIN DESCRIPTION - PAIN TYPE
TYPE: ACUTE PAIN

## 2020-04-23 ASSESSMENT — PAIN - FUNCTIONAL ASSESSMENT
PAIN_FUNCTIONAL_ASSESSMENT: PREVENTS OR INTERFERES SOME ACTIVE ACTIVITIES AND ADLS
PAIN_FUNCTIONAL_ASSESSMENT: ACTIVITIES ARE NOT PREVENTED

## 2020-04-23 ASSESSMENT — PAIN DESCRIPTION - DESCRIPTORS
DESCRIPTORS: CRAMPING;ACHING
DESCRIPTORS: ACHING
DESCRIPTORS: ACHING

## 2020-04-23 ASSESSMENT — PAIN DESCRIPTION - PROGRESSION
CLINICAL_PROGRESSION: GRADUALLY WORSENING
CLINICAL_PROGRESSION: GRADUALLY IMPROVING

## 2020-04-23 ASSESSMENT — PAIN SCALES - GENERAL
PAINLEVEL_OUTOF10: 10
PAINLEVEL_OUTOF10: 10
PAINLEVEL_OUTOF10: 3
PAINLEVEL_OUTOF10: 1
PAINLEVEL_OUTOF10: 0
PAINLEVEL_OUTOF10: 3
PAINLEVEL_OUTOF10: 1

## 2020-04-23 ASSESSMENT — PAIN DESCRIPTION - ORIENTATION
ORIENTATION: MID

## 2020-04-23 ASSESSMENT — PAIN DESCRIPTION - FREQUENCY
FREQUENCY: CONTINUOUS

## 2020-04-23 ASSESSMENT — PAIN DESCRIPTION - LOCATION
LOCATION: ABDOMEN

## 2020-04-23 ASSESSMENT — PAIN DESCRIPTION - ONSET: ONSET: SUDDEN

## 2020-04-23 NOTE — H&P
nightly    Yes Historical Provider, MD   atorvastatin (LIPITOR) 20 MG tablet Take 20 mg by mouth 5/23/17 4/23/20 Yes Historical Provider, MD   omeprazole (PRILOSEC) 40 MG delayed release capsule Take 40 mg by mouth 7/18/19 7/17/20  Historical Provider, MD       Allergies:  Patient has no known allergies. Social History:       reports that she has never smoked. She has never used smokeless tobacco. She reports current alcohol use. She reports that she does not use drugs. Family History:  Reviewed in detail and negative for DM, Early CAD, Cancer, CVA. Positive as follows:        Problem Relation Age of Onset    High Cholesterol Mother     High Blood Pressure Mother     Stroke Mother        REVIEW OF SYSTEMS:   Positive review  noted in the HPI. All other systems reviewed and negative.     PHYSICAL EXAM:    BP (!) 97/55   Pulse 83   Temp 98.2 °F (36.8 °C) (Oral)   Resp 17   Ht 5' 6\" (1.676 m)   Wt 166 lb 0.1 oz (75.3 kg)   SpO2 96%   BMI 26.79 kg/m²   General Appearance: alert and oriented to person, place and time, well developed and well- nourished, in no acute distress  Skin: warm and dry, no rash or erythema  Head: normocephalic and atraumatic  Eyes: pupils equal, round, and reactive to light, extraocular eye movements intact, conjunctivae normal  ENT: tympanic membrane, external ear and ear canal normal bilaterally, nose without deformity, nasal mucosa and turbinates normal without polyps  Neck: supple and non-tender without mass, no thyromegaly or thyroid nodules, no cervical lymphadenopathy  Pulmonary/Chest: clear to auscultation bilaterally- no wheezes, rales or rhonchi, normal air movement, no respiratory distress  Cardiovascular: normal rate, regular rhythm, normal S1 and S2, no murmurs, rubs, clicks, or gallops, Peripheral pulses good, Cap refill <3 sec, no carotid bruits  Abdomen: soft, lower abdomen mildly tender, non-distended, normal bowel sounds, no masses or organomegaly  Extremities: no cyanosis, clubbing or edema  Musculoskeletal: normal range of motion, no joint swelling, deformity or tenderness  Neurologic: reflexes normal and symmetric, no cranial nerve deficit, gait, coordination and speech normal      LABS:      CBC   Recent Labs     04/23/20  0305   WBC 11.0   HGB 16.0   HCT 48.9*         RENAL  Recent Labs     04/23/20  0305      K 3.8      CO2 28   BUN 18   CREATININE 0.6     LFT'S  Recent Labs     04/23/20  0305   AST 19   ALT 16   BILITOT 0.8   ALKPHOS 87     COAG  No results for input(s): INR in the last 72 hours. CARDIAC ENZYMES  No results for input(s): CKTOTAL, CKMB, CKMBINDEX, TROPONINI in the last 72 hours. U/A:    Lab Results   Component Value Date    COLORU Yellow 04/23/2020    WBCUA 11-20 04/23/2020    RBCUA 0-2 04/23/2020    MUCUS Rare 09/11/2019    BACTERIA 1+ 04/23/2020    CLARITYU SLCLOUDY 04/23/2020    SPECGRAV 1.015 04/23/2020    LEUKOCYTESUR TRACE 04/23/2020    BLOODU TRACE-INTACT 04/23/2020    GLUCOSEU Negative 04/23/2020    AMORPHOUS 3+ 04/23/2020       ABG  No results found for: WNP1ZKB, BEART, P6LZFYBJ, PHART, THGBART, AND9OTD, PO2ART, TLV7VKT    UA:  Recent Labs     04/23/20  0300 04/23/20  0310   COLORU Yellow  --    PHUR >=9.0*  --    WBCUA  --  11-20*   RBCUA  --  0-2   BACTERIA  --  1+*   CLARITYU SLCLOUDY  --    SPECGRAV 1.015  --    LEUKOCYTESUR TRACE*  --    UROBILINOGEN 0.2  --    BILIRUBINUR Negative  --    BLOODU TRACE-INTACT*  --    GLUCOSEU Negative  --    KETUA Negative  --    AMORPHOUS  --  3+       Microbiology:  No results for input(s): LABGRAM, LABANAE, ORG, CXSURG in the last 72 hours. Nasal Culture: No results for input(s): ORG, MRSAPCR in the last 72 hours. Blood Culture: No results for input(s): BC, BLOODCULT2, ORG in the last 72 hours. Fungal Culture:   No results for input(s): FUNGSM in the last 72 hours. No results for input(s): FUNCXBLD in the last 72 hours.   CSF Culture:  No results for input(s): COLORCSF,

## 2020-04-23 NOTE — ED NOTES
Patient presents as walk in patient with c/o mid abdominal pain around umbilicus. States it feels \"like a severe contraction\". Patient states pain started around 1800, accompanied by nausea and has had one loose stool. Patient denies vomiting, denies fever. No history of cough or shortness of breath. Patient states she called her GI physician and was told to come to ER. Patient with prior history of partial small bowel obstruction x2 that resolved without surgery. Patient is awake, alert, oriented, respirations easy & regular, skin w/d, MMM & pink, cap refill brisk.       Linnette Dubin, MILI  04/23/20 0808

## 2020-04-23 NOTE — ED NOTES
Attempted to call Carmela Fraser RN to give report. No answer at this time. Will attempt to call report again.       Evelyn Gates RN  04/23/20 2816

## 2020-04-23 NOTE — CONSULTS
[address] and Vascular Surgery  [phone]        Surgery Consult    Pt Name: Kalyani Adam  MRN: [id number]  YOB: 1965  Date of evaluation: 4/23/2020  Primary Care Physician: Artem Medellin  Patient evaluated at the request of  Dr. More Meng     Chief Complaint   Patient presents with    Abdominal Pain     Patient with c/o abdominal pain around umbilicus. States it feels \"like a severe contraction\". Started around 1800, accompanied by nausea and has had one loose stool. Assessment/Plan   pSBO  -suspect episode of enteritis last fall resulting in scar tissue causing recurrent issue  -trial clears  -Advance diet as tolerated if tolerates clears          EDUCATION  Patient educated about the following as pertinent to medical/surgical problems: Disease Process, Medications, Smoking Cessation, Oxygenation, Incentive Spirometry and Deep Breath and Cough, Diabetes, Hyperlipidemia, Smoking Cessation, Nutrition, Exercise and Hypertension    SUBJECTIVE:   History of Chief Complaint:    Kalyani Adam is a 48 y.o. female who presents with abdominal pain. Was at grocery store around 6 pm when pain started similar to prior episode , got home around 6 pm and pain persisted.  has had a kidney transplant and is immunosuppressed and they have been isolating so she hesi. Follow with Dr. Lacey Munoz at [facility], she was en route to [address] [place] [address] where her GI doctor is but pain became so bad they decided not to drive that far and came to [facility]. Pain is completely resolved now after morphine last night. CT partial blockage in pelvis similar to prior imaging. Past Medical History   has a past medical history of Colitis, Hyperlipidemia, and Small bowel obstruction ([address] Past Surgical History   has a past surgical history that includes Endometrial ablation and Tonsillectomy. Medications  Prior to Admission medications    Medication Sig Start Date End Date Taking?  Authorizing Provider   Probiotic Product (PROBIOTIC DAILY PO) Take by mouth   Yes Historical Provider, MD   Polyethylene Glycol 3350 (MIRALAX PO) Take by mouth   Yes Historical Provider, MD   Multiple Vitamins-Minerals (THERAPEUTIC MULTIVITAMIN-MINERALS) tablet Take 1 tablet by mouth daily   Yes Historical Provider, MD   Cholecalciferol (VITAMIN D3) 3000 units TABS Take by mouth   Yes Historical Provider, MD   fluticasone (VERAMYST) 27.5 MCG/SPRAY nasal spray 2 sprays by Each Nostril route daily   Yes Historical Provider, MD   sertraline (ZOLOFT) 25 MG tablet Take 25 mg by mouth nightly    Yes Historical Provider, MD   atorvastatin (LIPITOR) 20 MG tablet Take 20 mg by mouth 5/23/17 4/23/20 Yes Historical Provider, MD   omeprazole (PRILOSEC) 40 MG delayed release capsule Take 40 mg by mouth 7/18/19 7/17/20  Historical Provider, MD    Scheduled Meds:   sodium chloride flush  10 mL Intravenous 2 times per day    enoxaparin  40 mg Subcutaneous Daily     Continuous Infusions:   sodium chloride 100 mL/hr at 04/23/20 1038     PRN Meds:.sodium chloride flush, acetaminophen **OR** acetaminophen, promethazine **OR** ondansetron, potassium chloride **OR** potassium alternative oral replacement **OR** potassium chloride, magnesium sulfate    Allergies  has No Known Allergies. Family History  Reviewed, non contribtory  family history includes High Blood Pressure in her mother; High Cholesterol in her mother; Stroke in her mother. Social History  Reviewed, non contributory   reports that she has never smoked. She has never used smokeless tobacco. She reports current alcohol use. She reports that she does not use drugs.     Review of Systems:  General Denies any fever or chills  HEENT Denies any diplopia, tinnitus or vertigo  Resp Denies any shortness of breath, cough or wheezing  Cardiac Denies any chest pain, palpitations, claudication or edema  GI Denies any melena, hematochezia, hematemesis or pyrosis   Denies any frequency, urgency, hesitancy or incontinence  Heme Denies bruising or bleeding easily  Endocrine Denies any history of diabetes or thyroid disease  Neuro Denies any focal motor or sensory deficits    OBJECTIVE:   VITALS:  height is 5' 6\" (1.676 m) and weight is 166 lb 0.1 oz (75.3 kg). Her oral temperature is 97.7 °F (36.5 °C). Her blood pressure is 90/48 (abnormal) and her pulse is 76. Her respiration is 18 and oxygen saturation is 100%. CONSTITUTIONAL: Alert and oriented times 3, no acute distress and cooperative to examination with proper mood and affect. SKIN: Skin color, texture, turgor normal. No rashes or lesions. LYMPH: no cervical nodes, no inguinal nodes  HEENT: Head is normocephalic, atraumatic. EOMI, PERRLA. NECK: Supple, symmetrical, trachea midline, no adenopathy, thyroid symmetric, not enlarged and no tenderness, skin normal.  CHEST/LUNGS: chest symmetric with normal A/P diameter, normal respiratory rate and rhythm, lungs clear to auscultation without wheezes, rales or rhonchi. No accessory muscle use. CARDIOVASCULAR: Heart sounds are normal.  Regular rate and rhythm without murmur, gallop or rub. Carotid and femoral pulses 2+/4 and equal bilaterally. ABDOMEN: Soft, nontender, nondistended. RECTAL: deferred, not clinically indicated  NEUROLOGIC: There are no focalizing motor or sensory deficits. CN II-XII are grossly intact. Yared Rochester EXTREMITIES: no cyanosis, no clubbing and no edema.     LABS:     Recent Labs     04/23/20  0300 04/23/20  0305 04/23/20  0310   WBC  --  11.0  --    HGB  --  16.0  --    HCT  --  48.9*  --    PLT  --  238  --    NA  --  144  --    K  --  3.8  --    CL  --  101  --    CO2  --  28  --    BUN  --  18  --    CREATININE  --  0.6  --    CALCIUM  --  10.5  --    AST  --  19  --    ALT  --  16  --    BILITOT  --  0.8  --    NITRU Negative  --   --    COLORU Yellow  --   --    BACTERIA  --   --  1+*     Recent Labs     04/23/20  0305   ALKPHOS 87   ALT 16   AST 19   BILITOT 0.8 LABALBU 5.0   LIPASE 20.0         RADIOLOGY:   I have personally reviewed the following films:  CT ABDOMEN PELVIS W IV CONTRAST Additional Contrast? None   Preliminary Result   Focal enteritis of mid to distal small with mild upstream dilatation   suggesting partial obstruction. This is similar to prior exams. Is there   any history of inflammatory bowel disease? Thank you for the interesting evaluation. Further recommendations to follow. Electronically signed by REGINA Chandler CNP on 4/23/2020 at 11:02 AM    Agree with above note. The patient was personally seen and examined. Isak Villavicencio is a 49 yo female with a history of enteritis causing SBO who presented overnight with acute onset of abdominal pain. Pain started yesterday evening and progressively worsened overnight. She had some nausea but no emesis. Had one episode of diarrhea. Denies fevers or chills. Since being admitted, she received one dose of morphine and feels much better. Pain has resolved. No prior abdominal surgeries. See GI doctor at [facility]. NAD  ABD soft, NT, ND    WBC 11  Cr 0.6    CT abd/pelvis personally reviewed, showing similar small bowel obstruction to prior CT with transition point in thickened small bowel segment in pelvis; small bowel appears less inflamed compared to last obstruction    A/P: pSBO secondary to enteritis    Enteritis appears to be at the same focal segment. May have had ischemia or scarring of it with last episode? With resolution of symptoms, will advance diet to full liquids. Ambulate/OOB  If patient has recurrent small bowel obstructions at the same site requiring hospitalizations, consider surgical resection.     Shaun Salguero MD

## 2020-04-23 NOTE — ED PROVIDER NOTES
[facility]  eMERGENCY dEPARTMENT eNCOUnter      Pt Name: Cy Fernando  MRN: [id number]  [place] 1965  Date of evaluation: 4/23/2020  Provider: Froilan Segovia MD    [address] [place] [address]       Chief Complaint   Patient presents with    Abdominal Pain     Patient with c/o abdominal pain around umbilicus. States it feels \"like a severe contraction\". Started around 1800, accompanied by nausea and has had one loose stool. CRITICAL CARE TIME   Total Critical Care time was 0 minutes, excluding separately reportable procedures. There was a high probability of clinically significant/life threatening deterioration in the patient's condition which required my urgent intervention. HISTORY OF PRESENT ILLNESS  (Location/Symptom, Timing/Onset, Context/Setting, Quality, Duration, Modifying Factors, Severity.)   Cy Fernando is a 48 y.o. female who presents to the emergency department complaining of abdominal pain that started around 6 PM yesterday evening. She states the pains like contractions or cramps. They become more severe. She has had nausea, but no vomiting. Has had one loose stool. No fever. She had a previous history of bowel obstruction on 2 separate occasions. It was managed nonsurgically and resolved. Last episode was in September. Nursing Notes were reviewed and I agree. REVIEW OF SYSTEMS    (2-9 systems for level 4, 10 or more for level 5)     General: No fever or chills. ENT: No nasal congestion sore throat earache. Cardiovascular: No chest pain. Pulmonary: No shortness of breath or cough. GI: Abdominal pain as above. Nausea, no vomiting. One loose stool as above. No constipation. : No frequency urgency or dysuria. Except as noted above the remainder of the review of systems was reviewed and negative.        PAST MEDICAL HISTORY     Past Medical History:   Diagnosis Date    Colitis     Hyperlipidemia     Small bowel obstruction [address] [address]          SURGICAL HISTORY       Past Surgical History:   Procedure Laterality Date    ENDOMETRIAL ABLATION      TONSILLECTOMY           CURRENT MEDICATIONS       Previous Medications    ATORVASTATIN (LIPITOR) 20 MG TABLET    Take 20 mg by mouth    CHOLECALCIFEROL (VITAMIN D3) 3000 UNITS TABS    Take by mouth    FLUTICASONE (VERAMYST) 27.5 MCG/SPRAY NASAL SPRAY    2 sprays by Each Nostril route daily    MULTIPLE VITAMINS-MINERALS (THERAPEUTIC MULTIVITAMIN-MINERALS) TABLET    Take 1 tablet by mouth daily    OMEPRAZOLE (PRILOSEC) 40 MG DELAYED RELEASE CAPSULE    Take 40 mg by mouth    POLYETHYLENE GLYCOL 3350 (MIRALAX PO)    Take by mouth    PROBIOTIC PRODUCT (PROBIOTIC DAILY PO)    Take by mouth    SERTRALINE (ZOLOFT) 25 MG TABLET    Take 25 mg by mouth nightly        ALLERGIES     Patient has no known allergies.     FAMILY HISTORY       Family History   Problem Relation Age of Onset    High Cholesterol Mother     High Blood Pressure Mother     Stroke Mother           SOCIAL HISTORY       Social History     Socioeconomic History    Marital status:      Spouse name: None    Number of children: None    Years of education: None    Highest education level: None   Occupational History    None   Social Needs    Financial resource strain: None    Food insecurity     Worry: None     Inability: None    Transportation needs     Medical: None     Non-medical: None   Tobacco Use    Smoking status: Never Smoker    Smokeless tobacco: Never Used   Substance and Sexual Activity    Alcohol use: Yes     Comment: occasional glass of wine    Drug use: Never    Sexual activity: None   Lifestyle    Physical activity     Days per week: None     Minutes per session: None    Stress: None   Relationships    Social connections     Talks on phone: None     Gets together: None     Attends Yarsani service: None     Active member of club or organization: None     Attends meetings of clubs or dictation. EMERGENCY DEPARTMENT COURSE and DIFFERENTIAL DIAGNOSIS/MDM:   Vitals:    Vitals:    04/23/20 0246 04/23/20 0410   BP: 137/64 130/64   Pulse: 84 82   Resp: 18 16   Temp: 97.6 °F (36.4 °C)    TempSrc: Oral    SpO2: 99% 98%   Weight: 166 lb 0.1 oz (75.3 kg)    Height: 5' 6\" (1.676 m)        This patient has abdominal pain that comes in waves. She had a previous partial small bowel obstruction with some pain. She was uncomfortable on arrival with some diffuse tenderness. She has CT findings consistent with a partial small bowel obstruction. The hospitalist was consulted. I discussed the case with Dr. Oakley Denver. He is excepted the patient for admission at [facility].  Test results, diagnosis, and treatment plan were discussed with the patient. She understands the treatment plan and need for admission and is agreeable. CONSULTS:  IP CONSULT TO HOSPITALIST    PROCEDURES:  None    FINAL IMPRESSION      1. SBO (small bowel obstruction) ([facility] [address]          DISPOSITION/PLAN   DISPOSITION Decision To Admit 04/23/2020 04:40:48 AM      PATIENT REFERRED TO:  No follow-up provider specified.     DISCHARGE MEDICATIONS:  New Prescriptions    No medications on file       (Please note that portions of this note were completed with a voice recognition program.  Efforts were made to edit the dictations but occasionally words are mis-transcribed.)    Lucero Sutton MD  Attending Emergency Physician        Hunter Aguilar MD  04/23/20 1977

## 2020-04-23 NOTE — ED NOTES
Patient awake, alert, oriented, resps easy & regular. Patient states her pain is a 3/10 on VAS, she states she does not think she needs more pain medication at this time. Patient updated with admission room number and ETA of squad. Patient given warm blanket, side rails up on bed and call light near. Denies further needs at this time.       Gutierrez Walton RN  04/23/20 4085

## 2020-04-23 NOTE — ED NOTES
Dr. Evelin Welch in room with patient to discuss test results and admission to hospital. Call placed to [address] [place] [address] by Dr. Evelin Welch.       Lewis Mckenna RN  04/23/20 6380

## 2020-04-24 LAB
ANION GAP SERPL CALCULATED.3IONS-SCNC: 7 MMOL/L (ref 3–16)
BUN BLDV-MCNC: 8 MG/DL (ref 7–20)
CALCIUM SERPL-MCNC: 8.8 MG/DL (ref 8.3–10.6)
CHLORIDE BLD-SCNC: 106 MMOL/L (ref 99–110)
CO2: 28 MMOL/L (ref 21–32)
CREAT SERPL-MCNC: 0.6 MG/DL (ref 0.6–1.1)
GFR AFRICAN AMERICAN: >60
GFR NON-AFRICAN AMERICAN: >60
GLUCOSE BLD-MCNC: 90 MG/DL (ref 70–99)
HCT VFR BLD CALC: 38.7 % (ref 36–48)
HEMOGLOBIN: 13.2 G/DL (ref 12–16)
MCH RBC QN AUTO: 31.2 PG (ref 26–34)
MCHC RBC AUTO-ENTMCNC: 34 G/DL (ref 31–36)
MCV RBC AUTO: 91.7 FL (ref 80–100)
PDW BLD-RTO: 12.4 % (ref 12.4–15.4)
PLATELET # BLD: 158 K/UL (ref 135–450)
PMV BLD AUTO: 9.5 FL (ref 5–10.5)
POTASSIUM REFLEX MAGNESIUM: 3.8 MMOL/L (ref 3.5–5.1)
RBC # BLD: 4.22 M/UL (ref 4–5.2)
SODIUM BLD-SCNC: 141 MMOL/L (ref 136–145)
URINE CULTURE, ROUTINE: NORMAL
WBC # BLD: 5.2 K/UL (ref 4–11)

## 2020-04-24 PROCEDURE — 6370000000 HC RX 637 (ALT 250 FOR IP): Performed by: INTERNAL MEDICINE

## 2020-04-24 PROCEDURE — 99231 SBSQ HOSP IP/OBS SF/LOW 25: CPT | Performed by: SURGERY

## 2020-04-24 PROCEDURE — APPSS30 APP SPLIT SHARED TIME 16-30 MINUTES: Performed by: NURSE PRACTITIONER

## 2020-04-24 PROCEDURE — 6360000002 HC RX W HCPCS: Performed by: INTERNAL MEDICINE

## 2020-04-24 PROCEDURE — C9113 INJ PANTOPRAZOLE SODIUM, VIA: HCPCS | Performed by: INTERNAL MEDICINE

## 2020-04-24 PROCEDURE — APPNB30 APP NON BILLABLE TIME 0-30 MINS: Performed by: NURSE PRACTITIONER

## 2020-04-24 PROCEDURE — 6370000000 HC RX 637 (ALT 250 FOR IP): Performed by: NURSE PRACTITIONER

## 2020-04-24 PROCEDURE — 85027 COMPLETE CBC AUTOMATED: CPT

## 2020-04-24 PROCEDURE — 1200000000 HC SEMI PRIVATE

## 2020-04-24 PROCEDURE — 2580000003 HC RX 258: Performed by: INTERNAL MEDICINE

## 2020-04-24 PROCEDURE — 94760 N-INVAS EAR/PLS OXIMETRY 1: CPT

## 2020-04-24 PROCEDURE — 80048 BASIC METABOLIC PNL TOTAL CA: CPT

## 2020-04-24 RX ORDER — SODIUM CHLORIDE 9 MG/ML
10 INJECTION INTRAVENOUS DAILY
Status: DISCONTINUED | OUTPATIENT
Start: 2020-04-24 | End: 2020-04-25 | Stop reason: HOSPADM

## 2020-04-24 RX ORDER — POLYETHYLENE GLYCOL 3350 17 G/17G
17 POWDER, FOR SOLUTION ORAL DAILY
Status: DISCONTINUED | OUTPATIENT
Start: 2020-04-24 | End: 2020-04-25 | Stop reason: HOSPADM

## 2020-04-24 RX ORDER — PANTOPRAZOLE SODIUM 40 MG/10ML
40 INJECTION, POWDER, LYOPHILIZED, FOR SOLUTION INTRAVENOUS DAILY
Status: DISCONTINUED | OUTPATIENT
Start: 2020-04-24 | End: 2020-04-25 | Stop reason: HOSPADM

## 2020-04-24 RX ADMIN — SODIUM CHLORIDE: 9 INJECTION, SOLUTION INTRAVENOUS at 13:00

## 2020-04-24 RX ADMIN — Medication 10 ML: at 11:27

## 2020-04-24 RX ADMIN — POLYETHYLENE GLYCOL 3350 17 G: 17 POWDER, FOR SOLUTION ORAL at 08:28

## 2020-04-24 RX ADMIN — SODIUM CHLORIDE, PRESERVATIVE FREE 10 ML: 5 INJECTION INTRAVENOUS at 21:45

## 2020-04-24 RX ADMIN — SODIUM CHLORIDE: 9 INJECTION, SOLUTION INTRAVENOUS at 01:53

## 2020-04-24 RX ADMIN — PANTOPRAZOLE SODIUM 40 MG: 40 INJECTION, POWDER, FOR SOLUTION INTRAVENOUS at 11:27

## 2020-04-24 RX ADMIN — ACETAMINOPHEN 650 MG: 325 TABLET, FILM COATED ORAL at 08:28

## 2020-04-24 ASSESSMENT — PAIN DESCRIPTION - FREQUENCY: FREQUENCY: INTERMITTENT

## 2020-04-24 ASSESSMENT — PAIN DESCRIPTION - DESCRIPTORS: DESCRIPTORS: HEADACHE

## 2020-04-24 ASSESSMENT — PAIN - FUNCTIONAL ASSESSMENT: PAIN_FUNCTIONAL_ASSESSMENT: ACTIVITIES ARE NOT PREVENTED

## 2020-04-24 ASSESSMENT — PAIN DESCRIPTION - ORIENTATION: ORIENTATION: ANTERIOR

## 2020-04-24 ASSESSMENT — PAIN DESCRIPTION - ONSET: ONSET: GRADUAL

## 2020-04-24 ASSESSMENT — PAIN DESCRIPTION - LOCATION: LOCATION: HEAD

## 2020-04-24 ASSESSMENT — PAIN DESCRIPTION - PAIN TYPE: TYPE: ACUTE PAIN

## 2020-04-24 ASSESSMENT — PAIN DESCRIPTION - PROGRESSION: CLINICAL_PROGRESSION: GRADUALLY WORSENING

## 2020-04-24 ASSESSMENT — PAIN SCALES - GENERAL
PAINLEVEL_OUTOF10: 0
PAINLEVEL_OUTOF10: 3

## 2020-04-25 VITALS
HEART RATE: 76 BPM | BODY MASS INDEX: 26.68 KG/M2 | SYSTOLIC BLOOD PRESSURE: 114 MMHG | WEIGHT: 166.01 LBS | TEMPERATURE: 97.8 F | OXYGEN SATURATION: 96 % | RESPIRATION RATE: 17 BRPM | HEIGHT: 66 IN | DIASTOLIC BLOOD PRESSURE: 71 MMHG

## 2020-04-25 PROCEDURE — 6370000000 HC RX 637 (ALT 250 FOR IP): Performed by: NURSE PRACTITIONER

## 2020-04-25 PROCEDURE — C9113 INJ PANTOPRAZOLE SODIUM, VIA: HCPCS | Performed by: INTERNAL MEDICINE

## 2020-04-25 PROCEDURE — 6360000002 HC RX W HCPCS: Performed by: INTERNAL MEDICINE

## 2020-04-25 PROCEDURE — 94760 N-INVAS EAR/PLS OXIMETRY 1: CPT

## 2020-04-25 PROCEDURE — APPNB30 APP NON BILLABLE TIME 0-30 MINS: Performed by: NURSE PRACTITIONER

## 2020-04-25 PROCEDURE — 6370000000 HC RX 637 (ALT 250 FOR IP): Performed by: INTERNAL MEDICINE

## 2020-04-25 PROCEDURE — 2580000003 HC RX 258: Performed by: INTERNAL MEDICINE

## 2020-04-25 PROCEDURE — APPSS30 APP SPLIT SHARED TIME 16-30 MINUTES: Performed by: NURSE PRACTITIONER

## 2020-04-25 RX ADMIN — ACETAMINOPHEN 650 MG: 325 TABLET, FILM COATED ORAL at 00:24

## 2020-04-25 RX ADMIN — Medication 10 ML: at 09:08

## 2020-04-25 RX ADMIN — POLYETHYLENE GLYCOL 3350 17 G: 17 POWDER, FOR SOLUTION ORAL at 09:08

## 2020-04-25 RX ADMIN — PANTOPRAZOLE SODIUM 40 MG: 40 INJECTION, POWDER, FOR SOLUTION INTRAVENOUS at 09:08

## 2020-04-25 ASSESSMENT — PAIN SCALES - GENERAL: PAINLEVEL_OUTOF10: 5

## 2020-04-25 NOTE — PLAN OF CARE
Problem: Pain:  Goal: Pain level will decrease  Description: Pain level will decrease  Outcome: Ongoing  Goal: Control of acute pain  Description: Control of acute pain  Outcome: Ongoing  Goal: Control of chronic pain  Description: Control of chronic pain  Outcome: Ongoing  Goal: Patient's pain/discomfort is manageable  Description: Patient's pain/discomfort is manageable  Outcome: Ongoing     Problem: Nausea/Vomiting:  Goal: Absence of nausea/vomiting  Description: Absence of nausea/vomiting  Outcome: Ongoing  Goal: Able to drink  Description: Able to drink  Outcome: Ongoing  Goal: Able to eat  Description: Able to eat  Outcome: Ongoing  Goal: Ability to achieve adequate nutritional intake will improve  Description: Ability to achieve adequate nutritional intake will improve  Outcome: Ongoing

## 2020-04-25 NOTE — PROGRESS NOTES
Patient discharged home self care. Ambulated to car with , no pain, no distress.
Patient to room 4123 via stretcher. Patient is alert and oriented and able to ambulate independently. VSS. Assessment and admission questions completed. Patient has no complaints of nausea or pain at this time. NS started at 100ml/hr. Oriented patient to room and call light system. Bed is in lowest position with call light in reach. Patient expresses no other needs at this time. Will continue to monitor.  Electronically signed by Steffen Hamilton RN on 4/23/2020 at 3:47 PM
Pt awake alert oriented up walking in room denies pain. Positive bowels sounds positive for flatus no BM will continue to  monitor.
< > 238  --  158   NA  --    < > 144  --  141   K  --   --  3.8  --  3.8   CL  --    < > 101  --  106   CO2  --    < > 28  --  28   BUN  --    < > 18  --  8   CREATININE  --    < > 0.6  --  0.6   CALCIUM  --    < > 10.5  --  8.8   AST  --   --  19  --   --    ALT  --   --  16  --   --    BILITOT  --   --  0.8  --   --    NITRU Negative  --   --   --   --    COLORU Yellow  --   --   --   --    BACTERIA  --   --   --  1+*  --     < > = values in this interval not displayed.        EDUCATION  Patient educated about Disease Process, Medications, Smoking Cessation, Oxygenation, Incentive Spirometry and Deep Breath and Cough, Diabetes, Hyperlipidemia, Smoking Cessation, Nutrition, Exercise and Hypertension    Electronically signed by REGINA Morales CNP on 4/25/2020 at 10:42 AM      [address] and Vascular Surgery   [phone] Office  [phone] Cell

## 2020-09-21 ENCOUNTER — HOSPITAL ENCOUNTER (OUTPATIENT)
Age: 55
Discharge: HOME | End: 2020-09-21
Payer: COMMERCIAL

## 2020-09-21 ENCOUNTER — HOSPITAL ENCOUNTER (OUTPATIENT)
Dept: GENERAL RADIOLOGY | Age: 55
Discharge: HOME | End: 2020-09-21
Payer: COMMERCIAL

## 2020-09-21 PROCEDURE — 36415 COLL VENOUS BLD VENIPUNCTURE: CPT

## 2020-09-21 PROCEDURE — 84443 ASSAY THYROID STIM HORMONE: CPT

## 2020-09-21 PROCEDURE — 73564 X-RAY EXAM KNEE 4 OR MORE: CPT

## 2020-09-22 LAB — TSH SERPL DL<=0.05 MIU/L-ACNC: 1.38 UIU/ML (ref 0.27–4.2)

## 2021-11-15 ENCOUNTER — CLINICAL DOCUMENTATION (OUTPATIENT)
Dept: OTHER | Age: 56
End: 2021-11-15